# Patient Record
Sex: MALE | Race: WHITE | Employment: OTHER | URBAN - NONMETROPOLITAN AREA
[De-identification: names, ages, dates, MRNs, and addresses within clinical notes are randomized per-mention and may not be internally consistent; named-entity substitution may affect disease eponyms.]

---

## 2017-01-01 ENCOUNTER — APPOINTMENT (OUTPATIENT)
Dept: GENERAL RADIOLOGY | Age: 67
DRG: 296 | End: 2017-01-01
Payer: MEDICARE

## 2017-01-01 ENCOUNTER — HOSPITAL ENCOUNTER (INPATIENT)
Age: 67
LOS: 2 days | DRG: 296 | End: 2017-11-06
Attending: EMERGENCY MEDICINE | Admitting: INTERNAL MEDICINE
Payer: MEDICARE

## 2017-01-01 VITALS
TEMPERATURE: 95.5 F | WEIGHT: 284.39 LBS | HEIGHT: 72 IN | SYSTOLIC BLOOD PRESSURE: 47 MMHG | OXYGEN SATURATION: 94 % | DIASTOLIC BLOOD PRESSURE: 16 MMHG | BODY MASS INDEX: 38.52 KG/M2

## 2017-01-01 DIAGNOSIS — I46.9 CARDIOPULMONARY ARREST WITH SUCCESSFUL RESUSCITATION (HCC): Primary | ICD-10-CM

## 2017-01-01 LAB
ALBUMIN SERPL-MCNC: 2.9 G/DL (ref 3.5–5.1)
ALBUMIN SERPL-MCNC: 3.4 G/DL (ref 3.5–5.1)
ALBUMIN SERPL-MCNC: 3.5 G/DL (ref 3.5–5.1)
ALLEN TEST: ABNORMAL
ALLEN TEST: NEGATIVE
ALLEN TEST: POSITIVE
ALP BLD-CCNC: 116 U/L (ref 38–126)
ALP BLD-CCNC: 164 U/L (ref 38–126)
ALP BLD-CCNC: 99 U/L (ref 38–126)
ALT SERPL-CCNC: 105 U/L (ref 11–66)
ALT SERPL-CCNC: 63 U/L (ref 11–66)
ALT SERPL-CCNC: 81 U/L (ref 11–66)
ANION GAP SERPL CALCULATED.3IONS-SCNC: 13 MEQ/L (ref 8–16)
ANION GAP SERPL CALCULATED.3IONS-SCNC: 16 MEQ/L (ref 8–16)
ANION GAP SERPL CALCULATED.3IONS-SCNC: 16 MEQ/L (ref 8–16)
ANION GAP SERPL CALCULATED.3IONS-SCNC: 17 MEQ/L (ref 8–16)
ANION GAP SERPL CALCULATED.3IONS-SCNC: 17 MEQ/L (ref 8–16)
ANION GAP SERPL CALCULATED.3IONS-SCNC: 23 MEQ/L (ref 8–16)
ANION GAP SERPL CALCULATED.3IONS-SCNC: 9 MEQ/L (ref 8–16)
ANISOCYTOSIS: ABNORMAL
APTT: 34.4 SECONDS (ref 22–38)
APTT: 34.5 SECONDS (ref 22–38)
AST SERPL-CCNC: 143 U/L (ref 5–40)
AST SERPL-CCNC: 64 U/L (ref 5–40)
AST SERPL-CCNC: 98 U/L (ref 5–40)
BACTERIA: ABNORMAL
BANDED NEUTROPHILS ABSOLUTE COUNT: 0.5 THOU/MM3
BANDS PRESENT: 4 %
BASE EXCESS (CALCULATED): -17.9 MMOL/L (ref -2.5–2.5)
BASE EXCESS (CALCULATED): -6.8 MMOL/L (ref -2.5–2.5)
BASE EXCESS (CALCULATED): -7 MMOL/L (ref -2.5–2.5)
BASE EXCESS (CALCULATED): -8 MMOL/L (ref -2.5–2.5)
BASE EXCESS (CALCULATED): -8.5 MMOL/L (ref -2.5–2.5)
BASE EXCESS (CALCULATED): -8.5 MMOL/L (ref -2.5–2.5)
BASE EXCESS (CALCULATED): -9.2 MMOL/L (ref -2.5–2.5)
BASE EXCESS MIXED: -8.2 MMOL/L (ref -2–3)
BASOPHILIA: ABNORMAL
BASOPHILS # BLD: 0 %
BASOPHILS # BLD: 0.2 %
BASOPHILS # BLD: 0.5 %
BASOPHILS ABSOLUTE: 0 THOU/MM3 (ref 0–0.1)
BASOPHILS ABSOLUTE: 0 THOU/MM3 (ref 0–0.1)
BASOPHILS ABSOLUTE: 0.1 THOU/MM3 (ref 0–0.1)
BETA-HYDROXYBUTYRATE: 1.79 MG/DL (ref 0.2–2.81)
BETA-HYDROXYBUTYRATE: 9.38 MG/DL (ref 0.2–2.81)
BILIRUB SERPL-MCNC: 0.4 MG/DL (ref 0.3–1.2)
BILIRUB SERPL-MCNC: 0.4 MG/DL (ref 0.3–1.2)
BILIRUB SERPL-MCNC: 0.5 MG/DL (ref 0.3–1.2)
BILIRUBIN DIRECT: < 0.2 MG/DL (ref 0–0.3)
BILIRUBIN URINE: NEGATIVE
BLOOD, URINE: ABNORMAL
BUN BLDV-MCNC: 22 MG/DL (ref 7–22)
BUN BLDV-MCNC: 29 MG/DL (ref 7–22)
BUN BLDV-MCNC: 29 MG/DL (ref 7–22)
BUN BLDV-MCNC: 30 MG/DL (ref 7–22)
BUN BLDV-MCNC: 31 MG/DL (ref 7–22)
CALCIUM IONIZED: 1.13 MMOL/L (ref 1.12–1.32)
CALCIUM IONIZED: 1.14 MMOL/L (ref 1.12–1.32)
CALCIUM IONIZED: 1.15 MMOL/L (ref 1.12–1.32)
CALCIUM IONIZED: 1.15 MMOL/L (ref 1.12–1.32)
CALCIUM IONIZED: 1.19 MMOL/L (ref 1.12–1.32)
CALCIUM IONIZED: 1.2 MMOL/L (ref 1.12–1.32)
CALCIUM SERPL-MCNC: 7.6 MG/DL (ref 8.5–10.5)
CALCIUM SERPL-MCNC: 8 MG/DL (ref 8.5–10.5)
CALCIUM SERPL-MCNC: 8.1 MG/DL (ref 8.5–10.5)
CALCIUM SERPL-MCNC: 8.9 MG/DL (ref 8.5–10.5)
CASTS: ABNORMAL /LPF
CASTS: ABNORMAL /LPF
CHARACTER, URINE: ABNORMAL
CHLORIDE BLD-SCNC: 102 MEQ/L (ref 98–111)
CHLORIDE BLD-SCNC: 104 MEQ/L (ref 98–111)
CHLORIDE BLD-SCNC: 105 MEQ/L (ref 98–111)
CHLORIDE BLD-SCNC: 106 MEQ/L (ref 98–111)
CHLORIDE BLD-SCNC: 106 MEQ/L (ref 98–111)
CHLORIDE BLD-SCNC: 108 MEQ/L (ref 98–111)
CHLORIDE BLD-SCNC: 109 MEQ/L (ref 98–111)
CO2: 13 MEQ/L (ref 23–33)
CO2: 17 MEQ/L (ref 23–33)
CO2: 18 MEQ/L (ref 23–33)
CO2: 19 MEQ/L (ref 23–33)
COLLECTED BY:: ABNORMAL
COLOR: YELLOW
CREAT SERPL-MCNC: 1.8 MG/DL (ref 0.4–1.2)
CREAT SERPL-MCNC: 1.9 MG/DL (ref 0.4–1.2)
CREAT SERPL-MCNC: 2 MG/DL (ref 0.4–1.2)
CREAT SERPL-MCNC: 2 MG/DL (ref 0.4–1.2)
CREAT SERPL-MCNC: 2.1 MG/DL (ref 0.4–1.2)
CRYSTALS: ABNORMAL
DEVICE: ABNORMAL
DIFFERENTIAL, MANUAL: NORMAL
EOSINOPHIL # BLD: 0.1 %
EOSINOPHIL # BLD: 0.3 %
EOSINOPHIL # BLD: 1 %
EOSINOPHILS ABSOLUTE: 0 THOU/MM3 (ref 0–0.4)
EOSINOPHILS ABSOLUTE: 0 THOU/MM3 (ref 0–0.4)
EOSINOPHILS ABSOLUTE: 0.1 THOU/MM3 (ref 0–0.4)
EPITHELIAL CELLS, UA: ABNORMAL /HPF
FIBRINOGEN: 296 MG/100ML (ref 155–475)
FIO2, MIXED VENOUS: 80
GFR SERPL CREATININE-BSD FRML MDRD: 32 ML/MIN/1.73M2
GFR SERPL CREATININE-BSD FRML MDRD: 34 ML/MIN/1.73M2
GFR SERPL CREATININE-BSD FRML MDRD: 34 ML/MIN/1.73M2
GFR SERPL CREATININE-BSD FRML MDRD: 36 ML/MIN/1.73M2
GFR SERPL CREATININE-BSD FRML MDRD: 38 ML/MIN/1.73M2
GLUCOSE BLD-MCNC: 130 MG/DL (ref 70–108)
GLUCOSE BLD-MCNC: 131 MG/DL (ref 70–108)
GLUCOSE BLD-MCNC: 133 MG/DL (ref 70–108)
GLUCOSE BLD-MCNC: 136 MG/DL (ref 70–108)
GLUCOSE BLD-MCNC: 137 MG/DL (ref 70–108)
GLUCOSE BLD-MCNC: 144 MG/DL (ref 70–108)
GLUCOSE BLD-MCNC: 144 MG/DL (ref 70–108)
GLUCOSE BLD-MCNC: 159 MG/DL (ref 70–108)
GLUCOSE BLD-MCNC: 161 MG/DL (ref 70–108)
GLUCOSE BLD-MCNC: 164 MG/DL (ref 70–108)
GLUCOSE BLD-MCNC: 167 MG/DL (ref 70–108)
GLUCOSE BLD-MCNC: 181 MG/DL (ref 70–108)
GLUCOSE BLD-MCNC: 193 MG/DL (ref 70–108)
GLUCOSE BLD-MCNC: 200 MG/DL (ref 70–108)
GLUCOSE BLD-MCNC: 221 MG/DL (ref 70–108)
GLUCOSE BLD-MCNC: 230 MG/DL (ref 70–108)
GLUCOSE BLD-MCNC: 235 MG/DL (ref 70–108)
GLUCOSE BLD-MCNC: 277 MG/DL (ref 70–108)
GLUCOSE BLD-MCNC: 297 MG/DL (ref 70–108)
GLUCOSE BLD-MCNC: 318 MG/DL (ref 70–108)
GLUCOSE BLD-MCNC: 325 MG/DL (ref 70–108)
GLUCOSE BLD-MCNC: 327 MG/DL (ref 70–108)
GLUCOSE BLD-MCNC: 342 MG/DL (ref 70–108)
GLUCOSE BLD-MCNC: 351 MG/DL (ref 70–108)
GLUCOSE, URINE: 100 MG/DL
GLUCOSE, WHOLE BLOOD: 102 MG/DL (ref 70–108)
GLUCOSE, WHOLE BLOOD: 119 MG/DL (ref 70–108)
GLUCOSE, WHOLE BLOOD: 135 MG/DL (ref 70–108)
GLUCOSE, WHOLE BLOOD: 140 MG/DL (ref 70–108)
GLUCOSE, WHOLE BLOOD: 162 MG/DL (ref 70–108)
GLUCOSE, WHOLE BLOOD: 169 MG/DL (ref 70–108)
GLUCOSE, WHOLE BLOOD: 171 MG/DL (ref 70–108)
GLUCOSE, WHOLE BLOOD: 178 MG/DL (ref 70–108)
GLUCOSE, WHOLE BLOOD: 185 MG/DL (ref 70–108)
GLUCOSE, WHOLE BLOOD: 186 MG/DL (ref 70–108)
GLUCOSE, WHOLE BLOOD: 190 MG/DL (ref 70–108)
GLUCOSE, WHOLE BLOOD: 195 MG/DL (ref 70–108)
GLUCOSE, WHOLE BLOOD: 196 MG/DL (ref 70–108)
GLUCOSE, WHOLE BLOOD: 197 MG/DL (ref 70–108)
GLUCOSE, WHOLE BLOOD: 200 MG/DL (ref 70–108)
GLUCOSE, WHOLE BLOOD: 204 MG/DL (ref 70–108)
GLUCOSE, WHOLE BLOOD: 208 MG/DL (ref 70–108)
GLUCOSE, WHOLE BLOOD: 302 MG/DL (ref 70–108)
GLUCOSE, WHOLE BLOOD: 372 MG/DL (ref 70–108)
GRAM STAIN RESULT: NORMAL
HCO3, MIXED: 19 MMOL/L (ref 23–28)
HCO3: 13 MMOL/L (ref 23–28)
HCO3: 17 MMOL/L (ref 23–28)
HCO3: 18 MMOL/L (ref 23–28)
HCO3: 20 MMOL/L (ref 23–28)
HCO3: 20 MMOL/L (ref 23–28)
HCT VFR BLD CALC: 24.5 % (ref 42–52)
HCT VFR BLD CALC: 25.3 % (ref 42–52)
HCT VFR BLD CALC: 28.7 % (ref 42–52)
HCT VFR BLD CALC: 32.4 % (ref 42–52)
HEMOGLOBIN: 8.1 GM/DL (ref 14–18)
HEMOGLOBIN: 8.3 GM/DL (ref 14–18)
HEMOGLOBIN: 9.4 GM/DL (ref 14–18)
HEMOGLOBIN: 9.9 GM/DL (ref 14–18)
IFIO2: 100
IFIO2: 40
IFIO2: 40
IFIO2: 45
IFIO2: 60
IFIO2: 70
IFIO2: 80
INR BLD: 1.51 (ref 0.85–1.13)
INR BLD: 1.65 (ref 0.85–1.13)
KETONES, URINE: NEGATIVE
LACTIC ACID: 1.4 MMOL/L (ref 0.5–2.2)
LACTIC ACID: 2 MMOL/L (ref 0.5–2.2)
LACTIC ACID: 9.9 MMOL/L (ref 0.5–2.2)
LEUKOCYTE EST, POC: NEGATIVE
LV EF: 25 %
LVEF MODALITY: NORMAL
LYMPHOCYTES # BLD: 1.9 %
LYMPHOCYTES # BLD: 14 %
LYMPHOCYTES # BLD: 4.1 %
LYMPHOCYTES ABSOLUTE: 0.3 THOU/MM3 (ref 1–4.8)
LYMPHOCYTES ABSOLUTE: 0.6 THOU/MM3 (ref 1–4.8)
LYMPHOCYTES ABSOLUTE: 1.9 THOU/MM3 (ref 1–4.8)
MAGNESIUM: 2.2 MG/DL (ref 1.6–2.4)
MAGNESIUM: 2.3 MG/DL (ref 1.6–2.4)
MAGNESIUM: 3.6 MG/DL (ref 1.6–2.4)
MAGNESIUM: 3.6 MG/DL (ref 1.6–2.4)
MAGNESIUM: 4 MG/DL (ref 1.6–2.4)
MAGNESIUM: 4.5 MG/DL (ref 1.6–2.4)
MAGNESIUM: 4.5 MG/DL (ref 1.6–2.4)
MCH RBC QN AUTO: 29.9 PG (ref 27–31)
MCH RBC QN AUTO: 29.9 PG (ref 27–31)
MCH RBC QN AUTO: 30.3 PG (ref 27–31)
MCH RBC QN AUTO: 30.4 PG (ref 27–31)
MCHC RBC AUTO-ENTMCNC: 30.7 GM/DL (ref 33–37)
MCHC RBC AUTO-ENTMCNC: 32.7 GM/DL (ref 33–37)
MCHC RBC AUTO-ENTMCNC: 32.9 GM/DL (ref 33–37)
MCHC RBC AUTO-ENTMCNC: 33.1 GM/DL (ref 33–37)
MCV RBC AUTO: 90.8 FL (ref 80–94)
MCV RBC AUTO: 91.3 FL (ref 80–94)
MCV RBC AUTO: 91.4 FL (ref 80–94)
MCV RBC AUTO: 98.9 FL (ref 80–94)
METAMYELOCYTES: 2 %
MISCELLANEOUS LAB TEST RESULT: ABNORMAL
MODE: ABNORMAL
MODE: AC
MONOCYTES # BLD: 3 %
MONOCYTES # BLD: 4.4 %
MONOCYTES # BLD: 6.2 %
MONOCYTES ABSOLUTE: 0.4 THOU/MM3 (ref 0.4–1.3)
MONOCYTES ABSOLUTE: 0.7 THOU/MM3 (ref 0.4–1.3)
MONOCYTES ABSOLUTE: 0.8 THOU/MM3 (ref 0.4–1.3)
MRSA SCREEN RT-PCR: NEGATIVE
MRSA SCREEN: NORMAL
MYELOCYTES: 2 %
NITRITE, URINE: NEGATIVE
NUCLEATED RED BLOOD CELLS: 0 /100 WBC
O2 SAT, MIXED: 99 %
O2 SATURATION: 88 %
O2 SATURATION: 92 %
O2 SATURATION: 93 %
O2 SATURATION: 95 %
O2 SATURATION: 97 %
O2 SATURATION: 98 %
O2 SATURATION: 99 %
OSMOLALITY CALCULATION: 291.2 MOSMOL/KG (ref 275–300)
PCO2 (TEMP CORRECTED): 29 (ref 35–45)
PCO2 (TEMP CORRECTED): 31 (ref 35–45)
PCO2 (TEMP CORRECTED): 35 (ref 35–45)
PCO2 (TEMP CORRECTED): 40 (ref 35–45)
PCO2 (TEMP CORRECTED): 41 (ref 35–45)
PCO2, MIXED VENOUS: 44 MMHG (ref 41–51)
PCO2: 33 MMHG (ref 35–45)
PCO2: 37 MMHG (ref 35–45)
PCO2: 37 MMHG (ref 35–45)
PCO2: 44 MMHG (ref 35–45)
PCO2: 45 MMHG (ref 35–45)
PCO2: 48 MMHG (ref 35–45)
PCO2: 53 MMHG (ref 35–45)
PDW BLD-RTO: 17 % (ref 11.5–14.5)
PDW BLD-RTO: 17.5 % (ref 11.5–14.5)
PDW BLD-RTO: 18.1 % (ref 11.5–14.5)
PDW BLD-RTO: 18.5 % (ref 11.5–14.5)
PH (TEMPERATURE CORRECTED): 7.24 (ref 7.35–7.45)
PH (TEMPERATURE CORRECTED): 7.29 (ref 7.35–7.45)
PH (TEMPERATURE CORRECTED): 7.3 (ref 7.35–7.45)
PH (TEMPERATURE CORRECTED): 7.35 (ref 7.35–7.45)
PH (TEMPERATURE CORRECTED): 7.36 (ref 7.35–7.45)
PH BLOOD GAS: 7 (ref 7.35–7.45)
PH BLOOD GAS: 7.22 (ref 7.35–7.45)
PH BLOOD GAS: 7.23 (ref 7.35–7.45)
PH BLOOD GAS: 7.26 (ref 7.35–7.45)
PH BLOOD GAS: 7.28 (ref 7.35–7.45)
PH BLOOD GAS: 7.3 (ref 7.35–7.45)
PH BLOOD GAS: 7.32 (ref 7.35–7.45)
PH UA: 5
PH, MIXED: 7.24 (ref 7.31–7.41)
PHOSPHORUS: 3 MG/DL (ref 2.4–4.7)
PHOSPHORUS: 3.1 MG/DL (ref 2.4–4.7)
PHOSPHORUS: 3.4 MG/DL (ref 2.4–4.7)
PHOSPHORUS: 3.8 MG/DL (ref 2.4–4.7)
PHOSPHORUS: 5.4 MG/DL (ref 2.4–4.7)
PHOSPHORUS: 6.8 MG/DL (ref 2.4–4.7)
PLATELET # BLD: 154 THOU/MM3 (ref 130–400)
PLATELET # BLD: 234 THOU/MM3 (ref 130–400)
PLATELET # BLD: 278 THOU/MM3 (ref 130–400)
PLATELET # BLD: 316 THOU/MM3 (ref 130–400)
PLATELET ESTIMATE: ADEQUATE
PMV BLD AUTO: 8 MCM (ref 7.4–10.4)
PMV BLD AUTO: 8.7 MCM (ref 7.4–10.4)
PO2 (TEMP CORRECTED): 54 (ref 71–104)
PO2 (TEMP CORRECTED): 55 (ref 71–104)
PO2 (TEMP CORRECTED): 69 (ref 71–104)
PO2 (TEMP CORRECTED): 88 (ref 71–104)
PO2 (TEMP CORRECTED): 92 (ref 71–104)
PO2 MIXED: 139 MMHG (ref 25–40)
PO2: 108 MMHG (ref 71–104)
PO2: 110 MMHG (ref 71–104)
PO2: 178 MMHG (ref 71–104)
PO2: 60 MMHG (ref 71–104)
PO2: 72 MMHG (ref 71–104)
PO2: 78 MMHG (ref 71–104)
PO2: 90 MMHG (ref 71–104)
POIKILOCYTES: SLIGHT
POTASSIUM SERPL-SCNC: 2.8 MEQ/L (ref 3.5–5.2)
POTASSIUM SERPL-SCNC: 3 MEQ/L (ref 3.5–5.2)
POTASSIUM SERPL-SCNC: 3.2 MEQ/L (ref 3.5–5.2)
POTASSIUM SERPL-SCNC: 3.3 MEQ/L (ref 3.5–5.2)
POTASSIUM SERPL-SCNC: 3.5 MEQ/L (ref 3.5–5.2)
POTASSIUM SERPL-SCNC: 3.6 MEQ/L (ref 3.5–5.2)
POTASSIUM SERPL-SCNC: 3.6 MEQ/L (ref 3.5–5.2)
POTASSIUM SERPL-SCNC: 3.7 MEQ/L (ref 3.5–5.2)
POTASSIUM SERPL-SCNC: 3.9 MEQ/L (ref 3.5–5.2)
POTASSIUM SERPL-SCNC: 4.4 MEQ/L (ref 3.5–5.2)
POTASSIUM SERPL-SCNC: 4.6 MEQ/L (ref 3.5–5.2)
POTASSIUM SERPL-SCNC: 5.3 MEQ/L (ref 3.5–5.2)
PRO-BNP: 4833 PG/ML (ref 0–900)
PROTEIN UA: NEGATIVE MG/DL
RBC # BLD: 2.69 MILL/MM3 (ref 4.7–6.1)
RBC # BLD: 2.79 MILL/MM3 (ref 4.7–6.1)
RBC # BLD: 3.14 MILL/MM3 (ref 4.7–6.1)
RBC # BLD: 3.27 MILL/MM3 (ref 4.7–6.1)
RBC URINE: ABNORMAL /HPF
RENAL EPITHELIAL, UA: ABNORMAL
RESPIRATORY CULTURE: NORMAL
SEG NEUTROPHILS: 74 %
SEG NEUTROPHILS: 90.7 %
SEG NEUTROPHILS: 91.6 %
SEGMENTED NEUTROPHILS ABSOLUTE COUNT: 10.1 THOU/MM3 (ref 1.8–7.7)
SEGMENTED NEUTROPHILS ABSOLUTE COUNT: 12.1 THOU/MM3 (ref 1.8–7.7)
SEGMENTED NEUTROPHILS ABSOLUTE COUNT: 14.2 THOU/MM3 (ref 1.8–7.7)
SET PEEP: 10 MMHG
SET PEEP: 5 MMHG
SET RESPIRATORY RATE: 16 BPM
SET RESPIRATORY RATE: 16 BPM
SET RESPIRATORY RATE: 20 BPM
SET RESPIRATORY RATE: 22 BPM
SET TIDAL VOLUME: 540 ML
SET TIDAL VOLUME: 550 ML
SET TIDAL VOLUME: 600 ML
SITE: ABNORMAL
SODIUM BLD-SCNC: 136 MEQ/L (ref 135–145)
SODIUM BLD-SCNC: 136 MEQ/L (ref 135–145)
SODIUM BLD-SCNC: 138 MEQ/L (ref 135–145)
SODIUM BLD-SCNC: 138 MEQ/L (ref 135–145)
SODIUM BLD-SCNC: 140 MEQ/L (ref 135–145)
SODIUM BLD-SCNC: 141 MEQ/L (ref 135–145)
SODIUM BLD-SCNC: 143 MEQ/L (ref 135–145)
SOURCE, BLOOD GAS: ABNORMAL
SPECIFIC GRAVITY UA: 1.02 (ref 1–1.03)
TEMPERATURE: 33
TEMPERATURE: 33
TEMPERATURE: 34.2
TEMPERATURE: 35.2
TEMPERATURE: 35.5
TOTAL CK: 171 U/L (ref 55–170)
TOTAL PROTEIN: 5.6 G/DL (ref 6.1–8)
TOTAL PROTEIN: 6.1 G/DL (ref 6.1–8)
TOTAL PROTEIN: 6.8 G/DL (ref 6.1–8)
TROPONIN T: 0.03 NG/ML
TROPONIN T: 0.3 NG/ML
TROPONIN T: 0.36 NG/ML
TSH SERPL DL<=0.05 MIU/L-ACNC: 2.01 UIU/ML (ref 0.4–4.2)
UROBILINOGEN, URINE: 0.2 EU/DL
VRE CULTURE: NORMAL
WBC # BLD: 13.2 THOU/MM3 (ref 4.8–10.8)
WBC # BLD: 13.6 THOU/MM3 (ref 4.8–10.8)
WBC # BLD: 15.7 THOU/MM3 (ref 4.8–10.8)
WBC # BLD: 21.8 THOU/MM3 (ref 4.8–10.8)
WBC UA: ABNORMAL /HPF
YEAST: ABNORMAL

## 2017-01-01 PROCEDURE — 80053 COMPREHEN METABOLIC PANEL: CPT

## 2017-01-01 PROCEDURE — P9045 ALBUMIN (HUMAN), 5%, 250 ML: HCPCS | Performed by: INTERNAL MEDICINE

## 2017-01-01 PROCEDURE — 36600 WITHDRAWAL OF ARTERIAL BLOOD: CPT

## 2017-01-01 PROCEDURE — 6360000002 HC RX W HCPCS: Performed by: EMERGENCY MEDICINE

## 2017-01-01 PROCEDURE — 82803 BLOOD GASES ANY COMBINATION: CPT

## 2017-01-01 PROCEDURE — 6360000002 HC RX W HCPCS: Performed by: INTERNAL MEDICINE

## 2017-01-01 PROCEDURE — 85730 THROMBOPLASTIN TIME PARTIAL: CPT

## 2017-01-01 PROCEDURE — 84100 ASSAY OF PHOSPHORUS: CPT

## 2017-01-01 PROCEDURE — 2580000003 HC RX 258: Performed by: INTERNAL MEDICINE

## 2017-01-01 PROCEDURE — 2700000000 HC OXYGEN THERAPY PER DAY

## 2017-01-01 PROCEDURE — 82947 ASSAY GLUCOSE BLOOD QUANT: CPT

## 2017-01-01 PROCEDURE — 81001 URINALYSIS AUTO W/SCOPE: CPT

## 2017-01-01 PROCEDURE — 84484 ASSAY OF TROPONIN QUANT: CPT

## 2017-01-01 PROCEDURE — 82948 REAGENT STRIP/BLOOD GLUCOSE: CPT

## 2017-01-01 PROCEDURE — 6370000000 HC RX 637 (ALT 250 FOR IP): Performed by: INTERNAL MEDICINE

## 2017-01-01 PROCEDURE — 96375 TX/PRO/DX INJ NEW DRUG ADDON: CPT

## 2017-01-01 PROCEDURE — 85385 FIBRINOGEN ANTIGEN: CPT

## 2017-01-01 PROCEDURE — 93306 TTE W/DOPPLER COMPLETE: CPT

## 2017-01-01 PROCEDURE — 2500000003 HC RX 250 WO HCPCS

## 2017-01-01 PROCEDURE — 82330 ASSAY OF CALCIUM: CPT

## 2017-01-01 PROCEDURE — 84132 ASSAY OF SERUM POTASSIUM: CPT

## 2017-01-01 PROCEDURE — 83605 ASSAY OF LACTIC ACID: CPT

## 2017-01-01 PROCEDURE — 82248 BILIRUBIN DIRECT: CPT

## 2017-01-01 PROCEDURE — 85610 PROTHROMBIN TIME: CPT

## 2017-01-01 PROCEDURE — 83735 ASSAY OF MAGNESIUM: CPT

## 2017-01-01 PROCEDURE — 2500000003 HC RX 250 WO HCPCS: Performed by: INTERNAL MEDICINE

## 2017-01-01 PROCEDURE — 94640 AIRWAY INHALATION TREATMENT: CPT

## 2017-01-01 PROCEDURE — 93307 TTE W/O DOPPLER COMPLETE: CPT

## 2017-01-01 PROCEDURE — 82010 KETONE BODYS QUAN: CPT

## 2017-01-01 PROCEDURE — 2000000000 HC ICU R&B

## 2017-01-01 PROCEDURE — 5A1945Z RESPIRATORY VENTILATION, 24-96 CONSECUTIVE HOURS: ICD-10-PCS | Performed by: EMERGENCY MEDICINE

## 2017-01-01 PROCEDURE — P9046 ALBUMIN (HUMAN), 25%, 20 ML: HCPCS | Performed by: INTERNAL MEDICINE

## 2017-01-01 PROCEDURE — C9113 INJ PANTOPRAZOLE SODIUM, VIA: HCPCS | Performed by: INTERNAL MEDICINE

## 2017-01-01 PROCEDURE — 36415 COLL VENOUS BLD VENIPUNCTURE: CPT

## 2017-01-01 PROCEDURE — 85025 COMPLETE CBC W/AUTO DIFF WBC: CPT

## 2017-01-01 PROCEDURE — 87070 CULTURE OTHR SPECIMN AEROBIC: CPT

## 2017-01-01 PROCEDURE — L4387 NON-PNEUM WALK BOOT PRE OTS: HCPCS

## 2017-01-01 PROCEDURE — 2720000010 HC SURG SUPPLY STERILE

## 2017-01-01 PROCEDURE — 93005 ELECTROCARDIOGRAM TRACING: CPT

## 2017-01-01 PROCEDURE — A6212 FOAM DRG <=16 SQ IN W/BORDER: HCPCS

## 2017-01-01 PROCEDURE — 71010 XR CHEST PORTABLE: CPT

## 2017-01-01 PROCEDURE — 87040 BLOOD CULTURE FOR BACTERIA: CPT

## 2017-01-01 PROCEDURE — 84443 ASSAY THYROID STIM HORMONE: CPT

## 2017-01-01 PROCEDURE — 36592 COLLECT BLOOD FROM PICC: CPT

## 2017-01-01 PROCEDURE — 96376 TX/PRO/DX INJ SAME DRUG ADON: CPT

## 2017-01-01 PROCEDURE — 94003 VENT MGMT INPAT SUBQ DAY: CPT

## 2017-01-01 PROCEDURE — 92950 HEART/LUNG RESUSCITATION CPR: CPT

## 2017-01-01 PROCEDURE — 96365 THER/PROPH/DIAG IV INF INIT: CPT

## 2017-01-01 PROCEDURE — 6360000002 HC RX W HCPCS

## 2017-01-01 PROCEDURE — 99292 CRITICAL CARE ADDL 30 MIN: CPT

## 2017-01-01 PROCEDURE — 87086 URINE CULTURE/COLONY COUNT: CPT

## 2017-01-01 PROCEDURE — 82550 ASSAY OF CK (CPK): CPT

## 2017-01-01 PROCEDURE — 85027 COMPLETE CBC AUTOMATED: CPT

## 2017-01-01 PROCEDURE — 36556 INSERT NON-TUNNEL CV CATH: CPT

## 2017-01-01 PROCEDURE — 06HM33Z INSERTION OF INFUSION DEVICE INTO RIGHT FEMORAL VEIN, PERCUTANEOUS APPROACH: ICD-10-PCS | Performed by: EMERGENCY MEDICINE

## 2017-01-01 PROCEDURE — 87641 MR-STAPH DNA AMP PROBE: CPT

## 2017-01-01 PROCEDURE — 99291 CRITICAL CARE FIRST HOUR: CPT | Performed by: INTERNAL MEDICINE

## 2017-01-01 PROCEDURE — 83880 ASSAY OF NATRIURETIC PEPTIDE: CPT

## 2017-01-01 PROCEDURE — 99291 CRITICAL CARE FIRST HOUR: CPT

## 2017-01-01 PROCEDURE — 87081 CULTURE SCREEN ONLY: CPT

## 2017-01-01 PROCEDURE — 94002 VENT MGMT INPAT INIT DAY: CPT

## 2017-01-01 PROCEDURE — P9045 ALBUMIN (HUMAN), 5%, 250 ML: HCPCS

## 2017-01-01 PROCEDURE — 87205 SMEAR GRAM STAIN: CPT

## 2017-01-01 PROCEDURE — 80048 BASIC METABOLIC PNL TOTAL CA: CPT

## 2017-01-01 RX ORDER — ALBUMIN, HUMAN INJ 5% 5 %
25 SOLUTION INTRAVENOUS ONCE
Status: DISCONTINUED | OUTPATIENT
Start: 2017-01-01 | End: 2017-01-01 | Stop reason: SDUPTHER

## 2017-01-01 RX ORDER — PROPOFOL 10 MG/ML
10 INJECTION, EMULSION INTRAVENOUS
Status: DISCONTINUED | OUTPATIENT
Start: 2017-01-01 | End: 2017-01-01 | Stop reason: HOSPADM

## 2017-01-01 RX ORDER — PROPAFENONE HYDROCHLORIDE 150 MG/1
300 TABLET, FILM COATED ORAL DAILY
COMMUNITY

## 2017-01-01 RX ORDER — FENTANYL CITRATE 50 UG/ML
25 INJECTION, SOLUTION INTRAMUSCULAR; INTRAVENOUS
Status: DISCONTINUED | OUTPATIENT
Start: 2017-01-01 | End: 2017-01-01 | Stop reason: HOSPADM

## 2017-01-01 RX ORDER — POTASSIUM CHLORIDE 14.9 MG/ML
20 INJECTION INTRAVENOUS
Status: DISPENSED | OUTPATIENT
Start: 2017-01-01 | End: 2017-01-01

## 2017-01-01 RX ORDER — CISATRACURIUM BESYLATE 2 MG/ML
INJECTION, SOLUTION INTRAVENOUS
Status: COMPLETED
Start: 2017-01-01 | End: 2017-01-01

## 2017-01-01 RX ORDER — SODIUM CHLORIDE 0.9 % (FLUSH) 0.9 %
10 SYRINGE (ML) INJECTION EVERY 12 HOURS SCHEDULED
Status: DISCONTINUED | OUTPATIENT
Start: 2017-01-01 | End: 2017-01-01 | Stop reason: HOSPADM

## 2017-01-01 RX ORDER — DEXTROSE MONOHYDRATE 50 MG/ML
100 INJECTION, SOLUTION INTRAVENOUS PRN
Status: DISCONTINUED | OUTPATIENT
Start: 2017-01-01 | End: 2017-01-01 | Stop reason: HOSPADM

## 2017-01-01 RX ORDER — DOBUTAMINE HYDROCHLORIDE 200 MG/100ML
2.5 INJECTION INTRAVENOUS CONTINUOUS
Status: DISCONTINUED | OUTPATIENT
Start: 2017-01-01 | End: 2017-01-01

## 2017-01-01 RX ORDER — PANTOPRAZOLE SODIUM 40 MG/10ML
40 INJECTION, POWDER, LYOPHILIZED, FOR SOLUTION INTRAVENOUS DAILY
Status: DISCONTINUED | OUTPATIENT
Start: 2017-01-01 | End: 2017-01-01 | Stop reason: HOSPADM

## 2017-01-01 RX ORDER — ALBUMIN (HUMAN) 12.5 G/50ML
25 SOLUTION INTRAVENOUS ONCE
Status: COMPLETED | OUTPATIENT
Start: 2017-01-01 | End: 2017-01-01

## 2017-01-01 RX ORDER — ALBUMIN, HUMAN INJ 5% 5 %
SOLUTION INTRAVENOUS
Status: COMPLETED
Start: 2017-01-01 | End: 2017-01-01

## 2017-01-01 RX ORDER — PANTOPRAZOLE SODIUM 20 MG/1
40 TABLET, DELAYED RELEASE ORAL DAILY
COMMUNITY

## 2017-01-01 RX ORDER — ALBUMIN, HUMAN INJ 5% 5 %
12.5 SOLUTION INTRAVENOUS ONCE
Status: COMPLETED | OUTPATIENT
Start: 2017-01-01 | End: 2017-01-01

## 2017-01-01 RX ORDER — MINERAL OIL AND WHITE PETROLATUM 150; 830 MG/G; MG/G
OINTMENT OPHTHALMIC
Status: COMPLETED | OUTPATIENT
Start: 2017-01-01 | End: 2017-01-01

## 2017-01-01 RX ORDER — ALBUMIN, HUMAN INJ 5% 5 %
12.5 SOLUTION INTRAVENOUS ONCE
Status: DISCONTINUED | OUTPATIENT
Start: 2017-01-01 | End: 2017-01-01 | Stop reason: HOSPADM

## 2017-01-01 RX ORDER — ACETAMINOPHEN 325 MG/1
650 TABLET ORAL EVERY 4 HOURS PRN
Status: DISCONTINUED | OUTPATIENT
Start: 2017-01-01 | End: 2017-01-01 | Stop reason: HOSPADM

## 2017-01-01 RX ORDER — DEXTROSE MONOHYDRATE 25 G/50ML
12.5 INJECTION, SOLUTION INTRAVENOUS PRN
Status: DISCONTINUED | OUTPATIENT
Start: 2017-01-01 | End: 2017-01-01 | Stop reason: HOSPADM

## 2017-01-01 RX ORDER — CISATRACURIUM BESYLATE 2 MG/ML
0.15 INJECTION, SOLUTION INTRAVENOUS ONCE
Status: DISCONTINUED | OUTPATIENT
Start: 2017-01-01 | End: 2017-01-01

## 2017-01-01 RX ORDER — CHLORHEXIDINE GLUCONATE 0.12 MG/ML
15 RINSE ORAL 2 TIMES DAILY
Status: DISCONTINUED | OUTPATIENT
Start: 2017-01-01 | End: 2017-01-01 | Stop reason: HOSPADM

## 2017-01-01 RX ORDER — MAGNESIUM SULFATE IN WATER 40 MG/ML
4 INJECTION, SOLUTION INTRAVENOUS ONCE
Status: COMPLETED | OUTPATIENT
Start: 2017-01-01 | End: 2017-01-01

## 2017-01-01 RX ORDER — MIDAZOLAM HYDROCHLORIDE 1 MG/ML
1 INJECTION INTRAMUSCULAR; INTRAVENOUS EVERY 30 MIN PRN
Status: DISCONTINUED | OUTPATIENT
Start: 2017-01-01 | End: 2017-01-01 | Stop reason: HOSPADM

## 2017-01-01 RX ORDER — 0.9 % SODIUM CHLORIDE 0.9 %
1000 INTRAVENOUS SOLUTION INTRAVENOUS ONCE
Status: COMPLETED | OUTPATIENT
Start: 2017-01-01 | End: 2017-01-01

## 2017-01-01 RX ORDER — PROPOFOL 10 MG/ML
10 INJECTION, EMULSION INTRAVENOUS ONCE
Status: DISCONTINUED | OUTPATIENT
Start: 2017-01-01 | End: 2017-01-01

## 2017-01-01 RX ORDER — RAMIPRIL 10 MG/1
10 CAPSULE ORAL DAILY
COMMUNITY

## 2017-01-01 RX ORDER — NICOTINE POLACRILEX 4 MG
15 LOZENGE BUCCAL PRN
Status: DISCONTINUED | OUTPATIENT
Start: 2017-01-01 | End: 2017-01-01 | Stop reason: HOSPADM

## 2017-01-01 RX ORDER — ATORVASTATIN CALCIUM 10 MG/1
10 TABLET, FILM COATED ORAL DAILY
COMMUNITY

## 2017-01-01 RX ORDER — FUROSEMIDE 40 MG/1
40 TABLET ORAL DAILY
COMMUNITY

## 2017-01-01 RX ORDER — ATROPINE SULFATE 0.1 MG/ML
INJECTION INTRAVENOUS DAILY PRN
Status: COMPLETED | OUTPATIENT
Start: 2017-01-01 | End: 2017-01-01

## 2017-01-01 RX ORDER — HYDRALAZINE HYDROCHLORIDE 25 MG/1
25 TABLET, FILM COATED ORAL 3 TIMES DAILY
COMMUNITY

## 2017-01-01 RX ORDER — ASPIRIN 325 MG
325 TABLET ORAL DAILY
COMMUNITY

## 2017-01-01 RX ORDER — ONDANSETRON 2 MG/ML
4 INJECTION INTRAMUSCULAR; INTRAVENOUS EVERY 6 HOURS PRN
Status: DISCONTINUED | OUTPATIENT
Start: 2017-01-01 | End: 2017-01-01 | Stop reason: HOSPADM

## 2017-01-01 RX ORDER — ATROPINE SULFATE 1 MG/ML
INJECTION, SOLUTION INTRAMUSCULAR; INTRAVENOUS; SUBCUTANEOUS DAILY PRN
Status: COMPLETED | OUTPATIENT
Start: 2017-01-01 | End: 2017-01-01

## 2017-01-01 RX ORDER — SODIUM CHLORIDE 0.9 % (FLUSH) 0.9 %
10 SYRINGE (ML) INJECTION PRN
Status: DISCONTINUED | OUTPATIENT
Start: 2017-01-01 | End: 2017-01-01 | Stop reason: HOSPADM

## 2017-01-01 RX ORDER — GLIMEPIRIDE 4 MG/1
4 TABLET ORAL
COMMUNITY

## 2017-01-01 RX ORDER — SODIUM CHLORIDE 9 MG/ML
INJECTION, SOLUTION INTRAVENOUS CONTINUOUS
Status: ACTIVE | OUTPATIENT
Start: 2017-01-01 | End: 2017-01-01

## 2017-01-01 RX ORDER — METOPROLOL SUCCINATE 25 MG/1
37.5 TABLET, EXTENDED RELEASE ORAL DAILY
COMMUNITY

## 2017-01-01 RX ORDER — DOBUTAMINE HYDROCHLORIDE 200 MG/100ML
2.5 INJECTION INTRAVENOUS CONTINUOUS
Status: DISCONTINUED | OUTPATIENT
Start: 2017-01-01 | End: 2017-01-01 | Stop reason: HOSPADM

## 2017-01-01 RX ORDER — 0.9 % SODIUM CHLORIDE 0.9 %
500 INTRAVENOUS SOLUTION INTRAVENOUS ONCE
Status: COMPLETED | OUTPATIENT
Start: 2017-01-01 | End: 2017-01-01

## 2017-01-01 RX ORDER — ISOSORBIDE MONONITRATE 30 MG/1
30 TABLET, EXTENDED RELEASE ORAL DAILY
COMMUNITY

## 2017-01-01 RX ADMIN — MINERAL OIL AND WHITE PETROLATUM: 150; 830 OINTMENT OPHTHALMIC at 17:55

## 2017-01-01 RX ADMIN — SODIUM BICARBONATE 50 MEQ: 84 INJECTION INTRAVENOUS at 08:37

## 2017-01-01 RX ADMIN — SODIUM CHLORIDE 1000 ML: 9 INJECTION, SOLUTION INTRAVENOUS at 18:00

## 2017-01-01 RX ADMIN — FENTANYL CITRATE 25 MCG/HR: 50 INJECTION, SOLUTION INTRAMUSCULAR; INTRAVENOUS at 17:49

## 2017-01-01 RX ADMIN — FENTANYL CITRATE 25 MCG/HR: 50 INJECTION, SOLUTION INTRAMUSCULAR; INTRAVENOUS at 00:10

## 2017-01-01 RX ADMIN — MIDAZOLAM 2 MG: 1 INJECTION INTRAMUSCULAR; INTRAVENOUS at 17:39

## 2017-01-01 RX ADMIN — CHLORHEXIDINE GLUCONATE 15 ML: 1.2 RINSE ORAL at 09:31

## 2017-01-01 RX ADMIN — POTASSIUM CHLORIDE 20 MEQ: 200 INJECTION, SOLUTION INTRAVENOUS at 10:06

## 2017-01-01 RX ADMIN — MINERAL OIL AND WHITE PETROLATUM: 150; 830 OINTMENT OPHTHALMIC at 13:58

## 2017-01-01 RX ADMIN — SODIUM BICARBONATE 50 MEQ: 84 INJECTION INTRAVENOUS at 08:35

## 2017-01-01 RX ADMIN — MAGNESIUM SULFATE HEPTAHYDRATE: 500 INJECTION, SOLUTION INTRAMUSCULAR; INTRAVENOUS at 18:52

## 2017-01-01 RX ADMIN — PROPOFOL 10 MCG/KG/MIN: 10 INJECTION, EMULSION INTRAVENOUS at 15:37

## 2017-01-01 RX ADMIN — SODIUM BICARBONATE: 84 INJECTION INTRAVENOUS at 09:20

## 2017-01-01 RX ADMIN — ALBUTEROL SULFATE 2.5 MG: 2.5 SOLUTION RESPIRATORY (INHALATION) at 20:18

## 2017-01-01 RX ADMIN — PANTOPRAZOLE SODIUM 40 MG: 40 INJECTION, POWDER, FOR SOLUTION INTRAVENOUS at 19:35

## 2017-01-01 RX ADMIN — CHLORHEXIDINE GLUCONATE 15 ML: 1.2 RINSE ORAL at 23:24

## 2017-01-01 RX ADMIN — DOBUTAMINE HYDROCHLORIDE 5 MCG/KG/MIN: 200 INJECTION INTRAVENOUS at 08:53

## 2017-01-01 RX ADMIN — MINERAL OIL AND WHITE PETROLATUM: 150; 830 OINTMENT OPHTHALMIC at 00:06

## 2017-01-01 RX ADMIN — MINERAL OIL AND WHITE PETROLATUM: 150; 830 OINTMENT OPHTHALMIC at 15:20

## 2017-01-01 RX ADMIN — SODIUM CHLORIDE: 9 INJECTION, SOLUTION INTRAVENOUS at 01:00

## 2017-01-01 RX ADMIN — FENTANYL CITRATE 25 MCG/HR: 50 INJECTION, SOLUTION INTRAMUSCULAR; INTRAVENOUS at 04:36

## 2017-01-01 RX ADMIN — EPINEPHRINE 1 MG: 0.1 INJECTION, SOLUTION ENDOTRACHEAL; INTRACARDIAC; INTRAVENOUS at 12:48

## 2017-01-01 RX ADMIN — DOBUTAMINE HYDROCHLORIDE 2.5 MCG/KG/MIN: 200 INJECTION INTRAVENOUS at 18:43

## 2017-01-01 RX ADMIN — ATROPINE SULFATE 1 MG: 1 INJECTION, SOLUTION INTRAMUSCULAR; INTRAVENOUS; SUBCUTANEOUS at 13:04

## 2017-01-01 RX ADMIN — PANTOPRAZOLE SODIUM 40 MG: 40 INJECTION, POWDER, FOR SOLUTION INTRAVENOUS at 10:03

## 2017-01-01 RX ADMIN — SODIUM BICARBONATE 50 MEQ: 84 INJECTION INTRAVENOUS at 08:36

## 2017-01-01 RX ADMIN — EPINEPHRINE 1 MG: 0.1 INJECTION, SOLUTION ENDOTRACHEAL; INTRACARDIAC; INTRAVENOUS at 13:19

## 2017-01-01 RX ADMIN — LEVETIRACETAM 1000 MG: 100 INJECTION, SOLUTION INTRAVENOUS at 06:10

## 2017-01-01 RX ADMIN — SODIUM CHLORIDE 2 MCG/KG/MIN: 9 INJECTION, SOLUTION INTRAVENOUS at 01:24

## 2017-01-01 RX ADMIN — CISATRACURIUM BESYLATE 10 MG: 2 INJECTION INTRAVENOUS at 17:58

## 2017-01-01 RX ADMIN — CHLORHEXIDINE GLUCONATE 15 ML: 1.2 RINSE ORAL at 09:53

## 2017-01-01 RX ADMIN — MINERAL OIL AND WHITE PETROLATUM: 150; 830 OINTMENT OPHTHALMIC at 10:07

## 2017-01-01 RX ADMIN — ALBUTEROL SULFATE 2.5 MG: 2.5 SOLUTION RESPIRATORY (INHALATION) at 00:10

## 2017-01-01 RX ADMIN — SODIUM CHLORIDE 2 MCG/KG/MIN: 9 INJECTION, SOLUTION INTRAVENOUS at 19:22

## 2017-01-01 RX ADMIN — PANTOPRAZOLE SODIUM 40 MG: 40 INJECTION, POWDER, FOR SOLUTION INTRAVENOUS at 10:06

## 2017-01-01 RX ADMIN — SODIUM CHLORIDE 500 ML: 9 INJECTION, SOLUTION INTRAVENOUS at 19:41

## 2017-01-01 RX ADMIN — SODIUM CHLORIDE 2 MCG/KG/MIN: 9 INJECTION, SOLUTION INTRAVENOUS at 07:47

## 2017-01-01 RX ADMIN — MAGNESIUM SULFATE HEPTAHYDRATE 4 G: 40 INJECTION, SOLUTION INTRAVENOUS at 17:07

## 2017-01-01 RX ADMIN — EPINEPHRINE 5 MCG/MIN: 1 INJECTION PARENTERAL at 08:52

## 2017-01-01 RX ADMIN — ALBUMIN (HUMAN) 25 G: 0.25 INJECTION, SOLUTION INTRAVENOUS at 19:35

## 2017-01-01 RX ADMIN — SODIUM CHLORIDE: 9 INJECTION, SOLUTION INTRAVENOUS at 17:19

## 2017-01-01 RX ADMIN — ALBUTEROL SULFATE 2.5 MG: 2.5 SOLUTION RESPIRATORY (INHALATION) at 12:26

## 2017-01-01 RX ADMIN — Medication 5.8 UNITS/HR: at 20:46

## 2017-01-01 RX ADMIN — ALBUMIN (HUMAN) 12.5 G: 12.5 INJECTION, SOLUTION INTRAVENOUS at 03:17

## 2017-01-01 RX ADMIN — ATROPINE SULFATE 1 MG: 1 INJECTION, SOLUTION INTRAMUSCULAR; INTRAVENOUS; SUBCUTANEOUS at 13:16

## 2017-01-01 RX ADMIN — VASOPRESSIN 0.04 UNITS/MIN: 20 INJECTION INTRAVENOUS at 10:03

## 2017-01-01 RX ADMIN — ALBUMIN (HUMAN) 12.5 G: 12.5 INJECTION, SOLUTION INTRAVENOUS at 04:47

## 2017-01-01 RX ADMIN — Medication 10 MCG/KG/MIN: at 13:06

## 2017-01-01 RX ADMIN — LEVETIRACETAM 1000 MG: 100 INJECTION, SOLUTION INTRAVENOUS at 18:58

## 2017-01-01 RX ADMIN — LEVETIRACETAM 1000 MG: 100 INJECTION, SOLUTION INTRAVENOUS at 08:56

## 2017-01-01 RX ADMIN — MINERAL OIL AND WHITE PETROLATUM: 150; 830 OINTMENT OPHTHALMIC at 20:38

## 2017-01-01 RX ADMIN — ATROPINE SULFATE 1 MG: 0.1 INJECTION, SOLUTION ENDOTRACHEAL; INTRAMUSCULAR; INTRAVENOUS; SUBCUTANEOUS at 12:53

## 2017-01-01 RX ADMIN — EPINEPHRINE 1 MG: 0.1 INJECTION, SOLUTION ENDOTRACHEAL; INTRACARDIAC; INTRAVENOUS at 13:05

## 2017-01-01 RX ADMIN — SODIUM BICARBONATE 200 MEQ: 84 INJECTION, SOLUTION INTRAVENOUS at 06:11

## 2017-01-01 RX ADMIN — ALBUMIN (HUMAN) 12.5 G: 12.5 SOLUTION INTRAVENOUS at 09:45

## 2017-01-01 RX ADMIN — ALBUMIN (HUMAN) 25 G: 12.5 INJECTION, SOLUTION INTRAVENOUS at 09:52

## 2017-01-01 RX ADMIN — CISATRACURIUM BESYLATE 10 MG: 2 INJECTION, SOLUTION INTRAVENOUS at 17:58

## 2017-01-01 RX ADMIN — NOREPINEPHRINE BITARTRATE 22 MCG/MIN: 1 INJECTION INTRAVENOUS at 00:12

## 2017-01-01 RX ADMIN — CHLORHEXIDINE GLUCONATE 15 ML: 1.2 RINSE ORAL at 03:16

## 2017-01-01 RX ADMIN — MINERAL OIL AND WHITE PETROLATUM: 150; 830 OINTMENT OPHTHALMIC at 12:00

## 2017-01-01 RX ADMIN — SODIUM BICARBONATE 50 MEQ: 84 INJECTION INTRAVENOUS at 08:38

## 2017-01-01 RX ADMIN — Medication 1.2 UNITS/HR: at 18:14

## 2017-01-01 RX ADMIN — NOREPINEPHRINE BITARTRATE 30 MCG/MIN: 1 INJECTION INTRAVENOUS at 08:31

## 2017-01-01 RX ADMIN — LEVETIRACETAM 1000 MG: 100 INJECTION, SOLUTION INTRAVENOUS at 17:55

## 2017-01-01 RX ADMIN — ALBUMIN, HUMAN INJ 5% 25 G: 5 SOLUTION at 09:52

## 2017-01-01 RX ADMIN — MINERAL OIL AND WHITE PETROLATUM: 150; 830 OINTMENT OPHTHALMIC at 22:00

## 2017-01-01 RX ADMIN — Medication 12.9 UNITS/HR: at 04:20

## 2017-01-01 RX ADMIN — DOBUTAMINE HYDROCHLORIDE 2.5 MCG/KG/MIN: 200 INJECTION INTRAVENOUS at 10:12

## 2017-01-01 ASSESSMENT — PULMONARY FUNCTION TESTS
PIF_VALUE: 34
PIF_VALUE: 37
PIF_VALUE: 32
PIF_VALUE: 30
PIF_VALUE: 36
PIF_VALUE: 29
PIF_VALUE: 33
PIF_VALUE: 32
PIF_VALUE: 31
PIF_VALUE: 30
PIF_VALUE: 35
PIF_VALUE: 32

## 2017-01-01 ASSESSMENT — PAIN SCALES - GENERAL: PAINLEVEL_OUTOF10: 4

## 2017-11-04 NOTE — CODE DOCUMENTATION
Patient responding to atropin. HR rate . EKG shows A-fib with RVR. Dr. Tima Williamson and EMMETT Davidson at bedside to insert Zoll catheter.

## 2017-11-04 NOTE — ED PROVIDER NOTES
[facility]  eMERGENCY dEPARTMENT eNCOUnter          CHIEF COMPLAINT       Chief Complaint   Patient presents with    Code        Nurses Notes reviewed and I agree except as noted in the HPI. HISTORY OF PRESENT ILLNESS    Steve Carnes is a 78 y.o. male with a past medical history of CABG, hyperlipidemia, hypertension, and DM who presents to the ED via EMS following an unresponsive episode. Per EMS, the patient had been shopping and after leaving the store, had a witness unresponsive episode. CPR was started by bystanders after he was found on the ground unresponsive with no respirations. No significant down time between the patient's collapse and initiation of CPR. The patient was given epi x2 per EMS, intubated, and an IO line was established. Per EMS, the patient was initially in asystole, spontaneously developed a rhythm, then went back into asystole en route. History is limited secondary to acuity of patient's condition. REVIEW OF SYSTEMS       Unable to assess secondary to acuity of condition       PAST MEDICAL HISTORY    has a past medical history of DM w/o complication type II; Hyperlipidemia; and HYPERTENSION. SURGICAL HISTORY      has a past surgical history that includes Coronary angioplasty (7/11/11). CURRENT MEDICATIONS       Previous Medications    LISINOPRIL (PRINIVIL;ZESTRIL) 20 MG TABLET    Take 20 mg by mouth daily. METFORMIN (GLUCOPHAGE) 500 MG TABLET    Take 1,000 mg by mouth daily (with breakfast). SITAGLIPTAN (JANUVIA) 100 MG TABLET    Take 100 mg by mouth daily. ALLERGIES     has No Known Allergies. FAMILY HISTORY     has no family status information on file. family history is not on file. SOCIAL HISTORY          PHYSICAL EXAM     INITIAL VITALS:  weight is 280 lb (127 kg). His core temperature is 94.8 °F (34.9 °C). His blood pressure is 123/71 and his pulse is 81. His respiration is 19 and oxygen saturation is 88% (abnormal). following:     CO2 13 (*)     Glucose 318 (*)     CREATININE 1.9 (*)     All other components within normal limits   HEPATIC FUNCTION PANEL - Abnormal; Notable for the following:     Alkaline Phosphatase 164 (*)      (*)      (*)     All other components within normal limits   TROPONIN - Abnormal; Notable for the following:     Troponin T 0.025 (*)     All other components within normal limits   PHOSPHORUS - Abnormal; Notable for the following:     Phosphorus 6.8 (*)     All other components within normal limits   LACTIC ACID, PLASMA - Abnormal; Notable for the following:     Lactic Acid 9.9 (*)     All other components within normal limits   CK - Abnormal; Notable for the following: Total  (*)     All other components within normal limits   BLOOD GAS, ARTERIAL - Abnormal; Notable for the following:     pH, Blood Gas 7.00 (*)     PCO2 53 (*)     PO2 178 (*)     HCO3 13 (*)     Base Excess (Calculated) -17.9 (*)     All other components within normal limits   ANION GAP - Abnormal; Notable for the following: Anion Gap 23.0 (*)     All other components within normal limits   GLOMERULAR FILTRATION RATE, ESTIMATED - Abnormal; Notable for the following:     Est, Glom Filt Rate 36 (*)     All other components within normal limits   POCT GLUCOSE - Abnormal; Notable for the following:     POC Glucose 235 (*)     All other components within normal limits   CULTURE BLOOD #1   CULTURE BLOOD #2   APTT   MAGNESIUM   OSMOLALITY   MANUAL DIFFERENTIAL   LACTIC ACID, PLASMA       EMERGENCY DEPARTMENT COURSE:   Vitals:    Vitals:    11/04/17 1347 11/04/17 1419 11/04/17 1429 11/04/17 1524   BP: 104/71 123/68 123/71    Pulse: 90 83 81    Resp: 16 28 19    Temp:  92.5 °F (33.6 °C) 94.8 °F (34.9 °C)    TempSrc:  Core Core    SpO2: (!) 77% 91% (!) 88%    Weight:    280 lb (127 kg)     12:44 Patient arrived in the ED, CPR in progress and intubated. 12:46 Pulse check. No palpable pulse. CPR resumed.      12:48 Catheter size:  8 Fr    Landmarks identified: yes      Ultrasound guidance: yes      Sterile ultrasound techniques: Sterile gel and sterile probe covers were used      Number of attempts:  1    Successful placement: yes    Post-procedure details:     Post-procedure:  Line sutured    Assessment:  Blood return through all ports, free fluid flow, placement verified by x-ray and no pneumothorax on x-ray    Patient tolerance of procedure: Tolerated well, no immediate complications                 FINAL IMPRESSION      1. Cardiopulmonary arrest with successful resuscitation [facility])          DISPOSITION/PLAN   Admitted    DISCHARGE MEDICATIONS:  New Prescriptions    No medications on file       (Please note that portions of this note were completed with a voice recognition program.  Efforts were made to edit the dictations but occasionally words are mis-transcribed.)    This document serves as a record of the services and decisions personally performed and made by Alayna Barber DO. . It was created on their behalf by Ruby Willard, a trained medical scribe. The creation of this document is based the provider's statements to the medical scribe. Signed by: Alli Cochran, 3:31 PM 11/04/17     Provider: The information in this document, created by the medical scribe for me, accurately reflects the services I personally performed and the decisions made by me.      Alayna Barber DO. 3:31 PM 11/04/17           Alayna Barber DO  11/04/17 1537

## 2017-11-04 NOTE — ED NOTES
Dr. Maddy Aldridge placing [address] cath in right groin. Family member in room, Dr. Maddy Aldridge to speak to family.       Erum Troy RN  11/04/17 6620

## 2017-11-04 NOTE — FLOWSHEET NOTE
During my contact with the patient (the pt was unresponsive), so I offered emotional support and words of comfort to the pts family. The family expressed their thanks for the Spiritual support. Plan: It would be helpful to the pt and family to continue with Spiritual care.       11/04/17 1402   Encounter Summary   Services provided to: Family   Referral/Consult From: [facility]   Continue Visiting Yes  (11/4/17)   Complexity of Encounter Low   Length of Encounter 15 minutes   Routine   Type Initial   Assessment Approachable   Intervention Nurtured hope   Outcome Comfort;Expressed gratitude   Spiritual/Confucianism   Type Spiritual support

## 2017-11-04 NOTE — ED NOTES
Bed: 003A  Expected date: 11/4/17  Expected time:   Means of arrival:   Comments:     Robb Rabago  11/04/17 1242

## 2017-11-04 NOTE — PROGRESS NOTES
1610-received pt from ed per cart. To bed with 4 assist, connected to vent and icu monitor. Pt with frequent full body twitching and eye opening but does not respond to pain, no gag, cough or corneal reflex. Diprivan infusing at 20mcg. Dr Kris Ring in to assess. 1700-zoll connected and pt cooling iniated. 1739-2mg versed given for arterial line insertion. 1758-10mg nimbex given. Diprivan stopped. Dr Kris Ring attempting art line insertion. Pt with less twitching noted. 1825-art line x2 attempts unsuccessful. 1845-dobutrex started at 2.5mcg for CI 2.2.   1850-dr omntoya in to see. 1910-levo stopped. 1920-nimbex started at [facility] for twitching.

## 2017-11-04 NOTE — PROGRESS NOTES
The transport originated from ER. Pt. was transported to ICU. Assisting with the transport was teresita BOYD. A defibrillator was brought along on transport. Appropriate devices were applied to monitor the patient's condition during transport. A transport backpack was brought on transport, to be used in case of emergency. Patient transported  via 100% O2 via ventilator. Patient tolerated procedure well.

## 2017-11-05 NOTE — H&P
[address] [place] [address]                             HISTORY AND PHYSICAL    PATIENT NAME: Cori Kelley                     :         1950  MED REC NO:   [id number]                           ROOM:       0008  ACCOUNT NO:   [de-identified]                           ADMIT DATE:  2017  PROVIDER:     Anjum Monahan MD      CHIEF COMPLAINT:  Asystolic/PA arrest.    HISTORY OF PRESENT ILLNESS:  The patient is 43-year-old male with a  past medical history of CABG, hypertension, diabetes, who was found  unresponsive after leaving a department store. At that point in time,  CPR was performed by patrons of the department store until EMS arrived  at which time _____ was obtained. The patient intubated and brought  into our facility. Upon arrival at our emergency department, the  patient was noted to be in asystolic cardiac arrest.  The patient was  coded _____ was obtained. The patient was started on pressor  secondary to shock and therapeutic hypothermia was started. REVIEW OF SYSTEM:  Unable to assess secondary to the patient's current  status. PAST MEDICAL HISTORY:  1.  Diabetes. 2.  Hyperlipidemia. 3.  Hypertension. PAST SURGICAL HISTORY:  Coronary angioplasty on 2011. CURRENT MEDICATIONS:  Please see home reconciliation form for complete  list.    ALLERGIES:  No known drug allergies. SOCIAL HISTORY:  The patient lives with his wife. No tobacco or  alcohol use. FAMILY HISTORY:  No family history at this point in time. PHYSICAL EXAMINATION:  VITAL SIGNS:  Reviewed. GENERAL:  Comatose with gross twitching and sedated. HEENT:  Pupils, left is 5 mm; right is 3 to 4 mm, sluggishly reactive  bilaterally. ET tube 8.0, 23 cm at the lip. NECK:  Supple without masses. CARDIOVASCULAR:  Regular rate. S1 and S2. No appreciable murmurs.   LUNGS:  Clear to auscultation bilaterally without wheeze,

## 2017-11-05 NOTE — PLAN OF CARE
Problem: Discharge Planning:  Goal: Discharged to appropriate level of care  Discharged to appropriate level of care  Outcome: Ongoing  Patient is not a discharge candidate at this time. Planning in process, patient plans to return home with wife and family. Evaluation of any services needed to be addressed. Problem: Airway Clearance - Ineffective:  Goal: Ability to maintain a clear airway will improve  Ability to maintain a clear airway will improve  Outcome: Not Met This Shift  Patient remains intubated and paralyzed, unable to wean at this time. Problem: Aspiration:  Goal: Absence of aspiration  Absence of aspiration  Outcome: Ongoing  No evidence of aspiration noted. Lung sounds remain clear    Problem: Fluid Volume - Imbalance:  Goal: Absence of imbalanced fluid volume signs and symptoms  Absence of imbalanced fluid volume signs and symptoms  Outcome: Not Met This Shift  Patient is making scant amounts of urine. Problem: Mental Status - Impaired:  Goal: Mental status will be restored to baseline  Mental status will be restored to baseline  Outcome: Not Met This Shift      Problem: Serum Glucose Level - Abnormal:  Goal: Ability to maintain appropriate glucose levels will improve to within specified parameters  Ability to maintain appropriate glucose levels will improve to within specified parameters  Outcome: Ongoing  Patient started on an insulin gtt, blood sugars are monitored per glucostabilizer. Problem: Tissue Perfusion, Altered:  Goal: Circulatory function within specified parameters  Circulatory function within specified parameters  Outcome: Ongoing  Patient is on continuous [place] readings. Medication adjustments made per protocol to maintain parameters.     Problem: Tissue Perfusion - Cardiopulmonary, Altered:  Goal: Absence of angina  Absence of angina  Outcome: Not Met This Shift  KATJA  Goal: Hemodynamic stability will improve  Hemodynamic stability will improve  Outcome: Ongoing  VSS for prescribed parameters. Comments: Care plan reviewed with family members. Wife and son verbalize understanding of the plan of care and contribute to goal setting.

## 2017-11-05 NOTE — PROGRESS NOTES
ICU NOTE    Past 24 hours:  Admitted for therapeutic hypothermia 2/2 to PEA/Asystolic cardiac arrest, intubated    PHYSICAL EXAMINATION:  VITAL SIGNS:  Reviewed. GENERAL: Intubated/ sedated/ chemically paralyzed   HEENT:  Pupils, left is 4 mm; right is 3 to 4 mm, sluggishly reactive  bilaterally. ET tube 8.0, 23 cm at the lip. NECK:  Supple without masses. CARDIOVASCULAR:  Regular rate. S1 and S2. No appreciable murmurs. LUNGS: bilateral wheeze with min rhonchi. ABDOMEN:  Soft, nondistended. Hypoactive bowel sounds. EXTREMITIES:  DP, PT pulses are 2+ with trace lower extremity edema. NEUROLOGIC:  TCS score of 4 to 5 T (on admission) currently unable to perform adequate neuro exam 2/2 to chemical paralysis      LABS AND IMAGING:  Reviewed.     ASSESSMENT AND PLAN:    NEURO:  Therapeutic hypothermia post asystolic/PEA arrest-  per hospital protocol with some minor  Adjustments. Recommendations- rewarming to start 24 hours from the beginning of TH and not the time at which goal temp was reached  Stop replacing K+ when rewarming begins and change BG goal to 120-180  D/C paralytic when Temp of 36 degrees is reached during the rewarming phase  HR <30 is significant otherwise a HR>30 is fine  rewarm at .3 to.5 degree Celcius per hours leaving   Maintain SPO2 >94%  Analgesia/sedation-fentanyl drip, Versed drip, and d/c magnesium drip. Also for seizure  Shivering- as noted above with the addition of cisatracurium   2.   Seizures like activity ml anoxia, however at this point in time the risk outweigh the benefits of obtaining imaging, will await rewarming- cont with Keppra    CV:  PEA/Asystolic arrest- after further investigation including ACLS H's and T's- current nidus for the arrest suggest acidosis 2/2 to DKA  Maintaining MAP>80 with the assistance of levophed- per word of mouth EF significantly decreased from previous 1 week prior (Echo on 11/04/2017 showed EF of 25%), without right heart strain- stunned myocardium vs true insult- will cont dobutamine drip to maintain cardiac index at 2.5 to 4 L per minute- discussed treatment plan with cardiology- in agreement  IV fluids, normal saline 75 mL for 2 L. PULM:  acute respiratory failure, most likely secondary to neuro insult. The patient intubated, tolerated mechanical  ventilation. We will maintain PAC O2 at 35 to 45., CI to obtain SBT while pt is on a paralytic    GI:  Nutrition- n.p.o.  GI prophylaxis, Protonix. Transaminitis, most likely secondary to decreased perfusion- trend    Renal/ Lytes:   Acute kidney injury, most likely secondary to decreased perfusion. Anion gap metabolic acidosis with primary respiratory acidosis- grossly improved- cont to monitor   lactic acidosis ml 2/2 to DKA- resolved, will spot check   monitor and replace lytes as needed (per hospital protocol). In regards to potassium, we will treat during the cooling and maintenance if less 3.5. We will not correct during the rewarming stage. ID:  Controlled temp with grossly resolving WBC, no other SIRS/SOAP criteria met at this point in time. Low threshold for starting abx. currently no indication for antibiotics, although the patient has increased risks for sepsis most commonly associated with pneumonia. We will pan culture the patient's baseline every 12 hours till rewarming completed. ENDO:  DKA- per DKA protocol- resolved with a Bicarb >15, AG 13 without hypochloridemia . Again, we will trend anion gap based on insulin drip.     HEME:  DVT prophylaxis- SCD- risk outweigh benefits in starting chemoprophylaxis as no CT of the head was obtained prior to starting Therapeutic Hypothermia- will start chemoprophy once CT is obtained       [facility] time 65 min   Luis Conde MD

## 2017-11-05 NOTE — PLAN OF CARE
Problem: Impaired respiratory status  Goal: Normal spontaneous ventilation  Outcome: Ongoing  VENTILATOR LIBERATION PROTOCOL    PRE-TRIAL PATIENT ASSESSMENT - COMPLETED     Ventilatory Assessment:    PARAMETER CRITERIA FOR WEANING   Reversal  of the acute disease process that prompted intubation: No At least partial or complete reversal   FiO2 : 50 % FIO2 less than or equal to 50%     PEEP less than or equal to 5 cm H2O   Hemodynamic stability: No Dopamine or Dobutamine at 5 mcg/kg/minute or less   Adequate correction of electrolytes, Hgb, and HCT: No Within lab range   Neurologic stability: No Ability to cough, voluntarily initiate ventilator effort, cooperate with pulmonary toilet measures     ABG:   Lab Results   Component Value Date    PH 7.26 11/05/2017    PO2 90 11/05/2017    PCO2 45 11/05/2017    HCO3 20 11/05/2017    O2SAT 95 11/05/2017     HGB/WBC:  Lab Results   Component Value Date    HGB 8.1 (L) 11/05/2017    WBC 13.2 (H) 11/05/2017         Vital Signs:    PARAMETER CRITERIA FOR WEANING Meets Criteria   Pulse: 78 Within patient's normal limits / stable Yes   Resp: 23 Less than or equal to 30 Yes   BP  130/58 Within patient's normal limits / minimal pressors (Hemodynamically Stable) No   SpO2: 95 % Greater than or equal to 90% Yes   Temp: (!) 91 °F (32.8 °C) Less than 38. 5oC / 101. 3oF Yes    Sedation/paralytic weaned No     []    Based on this assessment and the Ventilator Liberation Protocol, this patient IS being placed on a Spontaneous Breathing Trial (SBT) at this time. [x]    Based on this assessment and the Ventilator Liberation Protocol, this patient IS NOT being placed on a Spontaneous Breathing Trial (SBT) at this time.

## 2017-11-05 NOTE — PROGRESS NOTES
1950 Report received from day shift RN    2000  Asssessment completed as charted. Patient is unresponsive due to starting paralytic 30 minutes ago. Pupils are unequal in size and shape, both are non reactive. Zoll at target temperature, see graphics. Repositioned for comfort. 2115 Family members are back to see patient. Update given. Wife states that she does not have medication list with her at this time, but will bring it tomorrow. She states that [facility] should have all of patient's records if they are needed. 2350 Assessment is as charted, pt repositioned. 0600 Urine specimen sent.

## 2017-11-05 NOTE — PROGRESS NOTES
Pt medication list received from family. Medication reconciliation incomplete. Need to verify home doses with family.

## 2017-11-05 NOTE — FLOWSHEET NOTE
11/05/17 1040   Train of Four   Twitches (Train of Four) 0   Number of Amps (Train of Four) 30  (drip adjusted see eMAR)   No seizures observed, no breathing over the ventilator, no shivering. Adjusted Nimbex (see eMAR).

## 2017-11-05 NOTE — FLOWSHEET NOTE
11/05/17 1300   Train of Four   Twitches (Train of Four) 0   Number of Amps (Train of Four) 30   Pt beginning to breathe above the ventilator, small twitching movements observed in arms and tongue. Nimbex adjusted (see eMAR).

## 2017-11-05 NOTE — PROGRESS NOTES
Inserted ETT 3cm per verbal orders from Dr Junior Sheikh. ETT advanced from 22cm to 25 cm from upper lip. Bilateral breath sounds heard good return tidal volume noted after changes.

## 2017-11-05 NOTE — FLOWSHEET NOTE
11/05/17 0844   Train of Four   Twitches (Train of Four) 0   Number of Amps (Train of Four) 30  (drip adjusted see eMAR)   No seizure activity is observed, pt is not breathing over the ventilator. Nimbex adjusted.

## 2017-11-05 NOTE — FLOWSHEET NOTE
11/05/17 0919   Train of Four   Twitches (Train of Four) 0   Number of Amps (Train of Four) 30  (drip adjusted see eMAR)     No seizure activity observed, pt is not breathing over the ventilator at this time. Nimbex adjusted.

## 2017-11-05 NOTE — PLAN OF CARE
Problem: Discharge Planning:  Goal: Discharged to appropriate level of care  Discharged to appropriate level of care   Outcome: Ongoing  No plan for discharge at this time. Will continue to monitor for plan of care needs. Problem: Aspiration:  Goal: Absence of aspiration  Absence of aspiration   Outcome: Ongoing  Pt remains intubated his shift. No evidence of aspiration. Will continue to monitor. Problem: Fluid Volume - Imbalance:  Goal: Absence of imbalanced fluid volume signs and symptoms  Absence of imbalanced fluid volume signs and symptoms   Outcome: Ongoing  Pt has bowling in place. Continue to monitor strict intake and output. Problem: Serum Glucose Level - Abnormal:  Goal: Ability to maintain appropriate glucose levels will improve to within specified parameters  Ability to maintain appropriate glucose levels will improve to within specified parameters   Remains on glucostabilizer at this time. Goal of glucose 200-250 during maintenance phase per orders. Will continue to monitor. Problem: Tissue Perfusion, Altered:  Goal: Circulatory function within specified parameters  Circulatory function within specified parameters   Outcome: Ongoing  Peripheral pulses equal and intact. Maintain MAP above 80 per orders. Will continue to monitor for altered tissue perfusion. Problem: Falls - Risk of  Goal: Absence of falls  Outcome: Ongoing  Pt remains in bed. Sedated and paralyzed on ventilator. Will continue to monitor for safety risks. Problem: Risk for Impaired Skin Integrity  Goal: Tissue integrity - skin and mucous membranes  Structural intactness and normal physiological function of skin and  mucous membranes. Outcome: Ongoing  No skin issues observed. Skin is cool, dry and intact. Sacral dressing in place for prevention. Pt is turned and repositioned every 2 hours and PRN. Arms and legs elevated on pillows. Comments: Care plan reviewed with patient and family.   Patient's family verbalizes understanding of the plan of care and contributes to goal setting.

## 2017-11-05 NOTE — PLAN OF CARE
Problem: Impaired respiratory status  Goal: Normal spontaneous ventilation  Outcome: Not Met This Shift  Pt is currently on hypothermia protocol, not appropriate for vent weaning.   Will monitor abgs and pulse ox for any needed vent changes and wean oxygen as tolerated

## 2017-11-06 NOTE — CARE COORDINATION
17 11:04 AM    [place] Was [place] to ED. Lines placed in ED. Admitted to ICU to Intensivist as Cardiology did not want to admit or manage Adan Ortiz, but accepted consult. Remained on vent and was on fentanyl and levophed drips. Rewarming was completed this am. Still no neurological recovery. Family chose to withdraw care. Drips were stopped and pt was extubated. Pt  in a very short time.

## 2017-11-06 NOTE — FLOWSHEET NOTE
Earlier today, I saw patient and his wife and son. We had prayer together. Patient is at the end of his life and family wanted him anointed. Father Natalie Ardon was notified. 11/06/17 0930   Encounter Summary   Services provided to: Patient and family together   Referral/Consult From: [address]; Children   Place of [address] [place] [address] Visiting Yes  (11/6 wanted anointing)   Complexity of Encounter High   Length of Encounter 15 minutes   Spiritual/Faith   Type Spiritual support   Assessment Grieving; Anxious   Intervention Active listening;Nurtured hope;Prayer   Outcome Coping   Grief and Life Adjustment   Type End of life   Assessment Grieving; Anxious   Intervention Nurtured hope;Sustaining presence/ Ministry of presence;Contacted support as requested per patient/family request   Who?  Father Julia Harris   Why? for anointing   At Request Of family   Outcome Comfort;Expressed gratitude;Encouraged; Hopeful;Grieving   words of encouragement given. Spiritual care card with Psalm 23, prayer or Prayer for peace was given. It has our information of service, hours and phone number on it.

## 2017-11-06 NOTE — PLAN OF CARE
Problem: Discharge Planning:  Goal: Discharged to appropriate level of care  Discharged to appropriate level of care   Outcome: Ongoing  Patient currently mechanically intubated. On zoll machine. In warming process. Patient requires 1:1 critical care at this time. Will continue to reassess. Problem: Aspiration:  Goal: Absence of aspiration  Absence of aspiration   Outcome: Met This Shift  HOB maintained above 30 degrees. Suction at bedside. Lung sounds remain clear. No signs of aspiration, oxygen saturation maintained >90%. Problem: Fluid Volume - Imbalance:  Goal: Absence of imbalanced fluid volume signs and symptoms  Absence of imbalanced fluid volume signs and symptoms   Outcome: Ongoing  Patient has rhonchi bilaterally. Pitting edema in lower extremities. Adequate intake and output. Will continue to monitor. Problem: Mental Status - Impaired:  Goal: Mental status will be restored to baseline  Mental status will be restored to baseline   Outcome: Ongoing  Patient currently in rewarming phase of zoll. Patient on nimbex drip and fentanyl drip. Will continue to monitor. Problem: Serum Glucose Level - Abnormal:  Goal: Ability to maintain appropriate glucose levels will improve to within specified parameters  Ability to maintain appropriate glucose levels will improve to within specified parameters   Outcome: Ongoing  Glucose checks q1h. Patient on insulin drip and glucose stabilizer. Problem: Tissue Perfusion, Altered:  Goal: Circulatory function within specified parameters  Circulatory function within specified parameters   Outcome: Met This Shift  Patient on medications for circulatory function. Titrated as ordered. Will continue to monitor. Problem: Falls - Risk of  Goal: Absence of falls  Outcome: Met This Shift  Patient paralyzed and sedated on ventilator. Valdivia in place. Hourly visual checks performed and charted.      Problem: Risk for Impaired Skin Integrity  Goal: Tissue integrity - skin and mucous membranes  Structural intactness and normal physiological function of skin and  mucous membranes. Outcome: Met This Shift  No signs of skin breakdown. Skin warm, dry, intact. Mucous membranes pink, moist. Patient turn with pillow support every 2 hours and PRN. Low air loss mattress in place. Sacral patch in place. Continue to monitor. Comments: Patient is unable to participate in care planning. Family has been updated and understands.

## 2017-11-06 NOTE — PROGRESS NOTES
8208- Dr. Ayush Garcia updated on continual hypotension and max dose reached on Levophed. Orders received to push 4 amps of Bicarb and start an Epinephrine gtt @ 5mcg/min    0845-  ECHO tech at bedside preforming limited Echocardiogram.  Dr. Ayush Garcia continues at bedside to observe. Updated family on pt condition. Orders received to restart Dobutamine gtt at 5 mcg/kg/min. 0471- Pt blood pressure continues to drop. Discussed with Dr. Ayush Garcia. Orders received to start Vasopressin if needed at 0.04 units/min when the Epinephrine gtt reaches 10 mcg/min. If patient continues to be hypotensive, may add Obie-synephrine. See orders. 2708- Dr. Ayush Garcia speaking with family at bedside. Per wife and son, they do not wish to continue life saving medications when bags run out. 1006- Verbal orders received per Dr. Ayush Garcia to increase Epinephrine gtt to 14 mcg/min. 0- Pt wife and son decided they want all care stopped and patient weaned off ventilation. Dr. Ayush Garcia updated. Ok with plan of care. 1051- IV medications discontinued at this time. 1053- Pt weaned from ventilation. 1119- LOOP notifed.   Referral number [id number]

## 2017-11-06 NOTE — PROGRESS NOTES
VENTILATOR LIBERATION PROTOCOL    PRE-TRIAL PATIENT ASSESSMENT - COMPLETED AT 0550    Ventilatory Assessment:    PARAMETER CRITERIA FOR WEANING   Reversal  of the acute disease process that prompted intubation: No At least partial or complete reversal   FiO2 : 40 % FIO2 less than or equal to 50%    PEEP 5 PEEP less than or equal to 5 cm H2O   Hemodynamic stability: No Dopamine or Dobutamine at 5 mcg/kg/minute or less   Adequate correction of electrolytes, Hgb, and HCT: No Within lab range   Neurologic stability: No Ability to cough, voluntarily initiate ventilator effort, cooperate with pulmonary toilet measures     ABG:   Lab Results   Component Value Date    PH 7.32 11/06/2017    PO2 108 11/06/2017    PCO2 33 11/06/2017    HCO3 17 11/06/2017    O2SAT 98 11/06/2017     HGB/WBC:  Lab Results   Component Value Date    HGB 8.3 (L) 11/06/2017    WBC 15.7 (H) 11/06/2017         Vital Signs:    PARAMETER CRITERIA FOR WEANING Meets Criteria   Pulse: 84 Within patient's normal limits / stable Yes   Resp: 22 Less than or equal to 30 Yes   BP: (!) 89/44 Within patient's normal limits / minimal pressors (Hemodynamically Stable) No   SpO2: 97 % Greater than or equal to 90% Yes   Temp: 95.4 °F (35.2 °C) Less than 38. 5oC / 101. 3oF N/A    Sedation/paralytic weaned No     []    Based on this assessment and the Ventilator Liberation Protocol, this patient IS being placed on a Spontaneous Breathing Trial (SBT) at this time. [x]    Based on this assessment and the Ventilator Liberation Protocol, this patient IS NOT being placed on a Spontaneous Breathing Trial (SBT) at this time. COMMENTS:  Pt remains on ZOLL.

## 2017-11-07 LAB
EKG ATRIAL RATE: 98 BPM
EKG Q-T INTERVAL: 436 MS
EKG QRS DURATION: 154 MS
EKG QTC CALCULATION (BAZETT): 568 MS
EKG R AXIS: -27 DEGREES
EKG T AXIS: 23 DEGREES
EKG VENTRICULAR RATE: 102 BPM
URINE CULTURE, ROUTINE: NORMAL

## 2017-11-07 NOTE — DISCHARGE SUMMARY
of  everything that we had done for their loved one. The patient passed  within 10 minutes of holding all treatment.         Silvia Us MD    D: 11/06/2017 11:26:29       T: 11/06/2017 12:34:55     [email or web address]  Job#: [id number]     Doc#: [id number]

## 2017-11-10 LAB
BLOOD CULTURE, ROUTINE: NORMAL
BLOOD CULTURE, ROUTINE: NORMAL